# Patient Record
Sex: MALE | Race: WHITE | NOT HISPANIC OR LATINO | URBAN - METROPOLITAN AREA
[De-identification: names, ages, dates, MRNs, and addresses within clinical notes are randomized per-mention and may not be internally consistent; named-entity substitution may affect disease eponyms.]

---

## 2023-10-16 PROBLEM — L71.9 ROSACEA, UNSPECIFIED: Status: ACTIVE | Noted: 2022-08-31

## 2023-10-16 PROBLEM — L73.8 OTHER SPECIFIED FOLLICULAR DISORDERS: Status: ACTIVE | Noted: 2022-08-31

## 2023-10-16 PROBLEM — L70.0 ACNE VULGARIS: Status: ACTIVE | Noted: 2022-08-31

## 2023-10-17 ENCOUNTER — OFFICE VISIT (OUTPATIENT)
Dept: DERMATOLOGY | Facility: CLINIC | Age: 53
End: 2023-10-17
Payer: COMMERCIAL

## 2023-10-17 DIAGNOSIS — L71.9 ROSACEA: Primary | ICD-10-CM

## 2023-10-17 PROCEDURE — 99213 OFFICE O/P EST LOW 20 MIN: CPT | Performed by: DERMATOLOGY

## 2023-10-17 RX ORDER — TAZAROTENE 1 MG/G
CREAM TOPICAL
COMMUNITY
Start: 2023-04-21

## 2023-10-17 RX ORDER — TAZAROTENE 1 MG/G
CREAM TOPICAL
Qty: 30 G | Refills: 11 | Status: SHIPPED | OUTPATIENT
Start: 2023-10-17

## 2023-10-17 RX ORDER — DOXEPIN HYDROCHLORIDE 10 MG/ML
10 SOLUTION ORAL
COMMUNITY
Start: 2023-06-27

## 2023-10-17 RX ORDER — LORATADINE 10 MG/1
10 TABLET ORAL
COMMUNITY
Start: 2022-11-26

## 2023-10-17 RX ORDER — AZELAIC ACID 0.15 G/G
GEL TOPICAL
Qty: 50 G | Refills: 11 | Status: SHIPPED | OUTPATIENT
Start: 2023-10-17

## 2023-10-17 RX ORDER — RAMIPRIL 10 MG/1
10 CAPSULE ORAL DAILY
COMMUNITY

## 2023-10-17 RX ORDER — CLASCOTERONE 1 G/100G
CREAM TOPICAL
COMMUNITY

## 2023-10-17 RX ORDER — CLASCOTERONE 1 G/100G
CREAM TOPICAL
Qty: 60 G | Refills: 11 | Status: SHIPPED | OUTPATIENT
Start: 2023-10-17 | End: 2024-05-24 | Stop reason: SDUPTHER

## 2023-10-17 RX ORDER — AZELAIC ACID 0.15 G/G
GEL TOPICAL
COMMUNITY
Start: 2023-06-20

## 2023-10-17 RX ORDER — AMOXICILLIN 250 MG/1
CAPSULE ORAL
Qty: 180 CAPSULE | Refills: 3 | Status: SHIPPED | OUTPATIENT
Start: 2023-10-17

## 2023-10-17 RX ORDER — AMOXICILLIN 250 MG/1
CAPSULE ORAL
COMMUNITY
Start: 2023-09-25

## 2023-10-17 NOTE — PROGRESS NOTES
Subjective     Ravin Ashraf is a 53 y.o. male who presents for the following: Rosacea (Pt using amoxicillin, azelaic acid gel, tazorac 0.1% cream, winlevi 1% cream. - pt states at baseline. ).     Review of Systems:  No other skin or systemic complaints other than what is documented elsewhere in the note.    The following portions of the chart were reviewed this encounter and updated as appropriate:   Allergies  Meds  Problems  Med Hx  Surg Hx  Fam Hx         Skin Cancer History  No skin cancer on file.      Specialty Problems          Dermatology Problems    Acne vulgaris    Other specified follicular disorders    Rosacea, unspecified        Objective   Well appearing patient in no apparent distress; mood and affect are within normal limits.    A focused skin examination was performed. All findings within normal limits unless otherwise noted below.    Assessment/Plan   1. Rosacea  Head - Anterior (Face)  Mild erythema; telangiectasias    -Discussed nature of this chronic condition  -Recommend gentle skin care habits including gentle cleansers, non-comedogenic physical/mineral based sunscreen daily. Avoid exfoliation, wind and extreme temperatures when possible.  -Stable, recommend to continue regimen  -Continue amoxicillin 150mg BID  -Continue azelaic acid 15% gel once daily  -Continue tazorac 0.1% cream once daily  -Continue Winlevi 1% cream once daily  -Prescriptions refilled today    azelaic acid (Finacea) 15 % gel - Head - Anterior (Face)  Apply to affected areas once daily    Related Medications  amoxicillin (Amoxil) 250 mg capsule  Take one capsule by mouth twice daily    tazarotene (Tazorac) 0.1 % cream  Apply to affected area once daily    clascoterone (Winlevi) 1 % cream  Apply a generous layer to the face once daily        Follow up in 1 year for Rosacea  Discussed if there are any changes or development of concerning symptoms (lesion/skin condition is changing, bleeding, enlarging, or worsening)  the patient is to contact my office. The patient verbalizes understanding.    Caridad Perez MD  10/17/2023

## 2023-10-30 ENCOUNTER — TELEPHONE (OUTPATIENT)
Dept: DERMATOLOGY | Facility: CLINIC | Age: 53
End: 2023-10-30
Payer: COMMERCIAL

## 2023-10-30 NOTE — TELEPHONE ENCOUNTER
Pt was contacted at this time to inform that the [facility] is no longer able to fill the Winlevi.  Pt states he has a number to call that he is able to get the medication for approx $50.00, pt states he will use this service and that if the office has to do anything he will reach out.

## 2024-05-23 DIAGNOSIS — L71.9 ROSACEA: Primary | ICD-10-CM

## 2024-05-24 ENCOUNTER — TELEPHONE (OUTPATIENT)
Dept: DERMATOLOGY | Facility: CLINIC | Age: 54
End: 2024-05-24
Payer: COMMERCIAL

## 2024-05-24 DIAGNOSIS — L71.9 ROSACEA: ICD-10-CM

## 2024-05-24 RX ORDER — CLASCOTERONE 1 G/100G
CREAM TOPICAL
Qty: 60 G | Refills: 5 | Status: SHIPPED | OUTPATIENT
Start: 2024-05-24

## 2024-05-24 NOTE — TELEPHONE ENCOUNTER
T SAW A DR FOR HIS HYPERPLASIA DR GAVE HIM A REFILL BUT WILL NOT BE RETURNING TO THIS DR.  WANTS TO KNOW IF DR PICHARDO CAN REFILL?  IF HE NEEDS TO COME IN AND SEE DR PICHARDO FOR A VISIT HE WILL.

## 2024-05-24 NOTE — PROGRESS NOTES
Patient requesting Winlevi Rx to [facility]. I ordered this for the patient in October. Rx re-ordered and sent to requested pharmacy. Nurse to notify the patient.

## 2024-05-28 RX ORDER — TAZAROTENE 1 MG/G
CREAM TOPICAL
Qty: 30 G | Refills: 0 | Status: SHIPPED | OUTPATIENT
Start: 2024-05-28

## 2024-10-15 ENCOUNTER — APPOINTMENT (OUTPATIENT)
Dept: DERMATOLOGY | Facility: CLINIC | Age: 54
End: 2024-10-15
Payer: COMMERCIAL

## 2024-10-15 DIAGNOSIS — L71.9 ROSACEA: Primary | ICD-10-CM

## 2024-10-15 DIAGNOSIS — L82.1 SEBORRHEIC KERATOSIS: ICD-10-CM

## 2024-10-15 PROCEDURE — 99213 OFFICE O/P EST LOW 20 MIN: CPT | Performed by: DERMATOLOGY

## 2024-10-15 RX ORDER — AMOXICILLIN 250 MG/1
CAPSULE ORAL
Qty: 180 CAPSULE | Refills: 3 | Status: SHIPPED | OUTPATIENT
Start: 2024-10-15

## 2024-10-15 NOTE — PROGRESS NOTES
Subjective     Ravin Ashraf is a 54 y.o. male who presents for the following: Rosacea (Pt currently taking amoxicillin, using azelaic acid gel, tazorac cream and winlevi cream- good response. ).     Review of Systems:  No other skin or systemic complaints other than what is documented elsewhere in the note.    The following portions of the chart were reviewed this encounter and updated as appropriate:   Allergies  Meds  Problems  Med Hx  Surg Hx  Fam Hx                 Objective   Well appearing patient in no apparent distress; mood and affect are within normal limits.    A focused skin examination was performed. All findings within normal limits unless otherwise noted below.    Assessment/Plan   1. Rosacea  Head - Anterior (Face)  Telangiectasias today; no erythema    Well controlled  -Stable, recommend to continue regimen  -Patient may seek PDL with a dermatologist at [place] or [place] who offers this if wishes to improve telangiectasias  -Continue amoxicillin 150mg BID; refilled today  -Continue azelaic acid 15% gel once daily; patient declines refills today, will call when desires refills  -Continue tazorac 0.1% cream once daily; patient declines refills today, will call when desires refills  -Continue Winlevi 1% cream once daily; patient declines refills today, will call when desires refills. Sent to [facility] in [place]      Related Medications  clascoterone (Winlevi) 1 % cream  Apply a generous layer to the face once daily    amoxicillin (Amoxil) 250 mg capsule  Take one capsule by mouth two times a day with food    2. Seborrheic keratosis  Left Postauricular Area  Stuck on, waxy papule with comedo-like openings and milia like cysts    (lesion of patient's concern)  -Discussed the nature of the diagnosis  -Reassurance, recommend continued observation        Follow up in 1 year for Rosacea  Discussed if there are any changes or development of concerning symptoms (lesion/skin condition is  changing, bleeding, enlarging, or worsening) the patient is to contact my office. The patient verbalizes understanding.    Caridad Perez MD  10/15/2024

## 2025-01-07 DIAGNOSIS — L71.9 ROSACEA: Primary | ICD-10-CM

## 2025-01-07 RX ORDER — AZELAIC ACID 0.15 G/G
GEL TOPICAL
Qty: 50 G | Refills: 0 | Status: SHIPPED | OUTPATIENT
Start: 2025-01-07

## 2025-04-27 DIAGNOSIS — L71.9 ROSACEA: ICD-10-CM

## 2025-04-28 RX ORDER — AZELAIC ACID 0.15 G/G
GEL TOPICAL
Qty: 50 G | Refills: 1 | Status: SHIPPED | OUTPATIENT
Start: 2025-04-28

## 2025-05-01 DIAGNOSIS — L71.9 ROSACEA: Primary | ICD-10-CM

## 2025-05-01 RX ORDER — TAZAROTENE 1 MG/G
CREAM TOPICAL
Qty: 30 G | Refills: 1 | Status: SHIPPED | OUTPATIENT
Start: 2025-05-01

## 2025-06-03 DIAGNOSIS — L71.9 ROSACEA: ICD-10-CM

## 2025-06-04 RX ORDER — CLASCOTERONE 1 G/100G
CREAM TOPICAL
Qty: 60 G | Refills: 5 | Status: SHIPPED | OUTPATIENT
Start: 2025-06-04

## 2025-10-21 ENCOUNTER — APPOINTMENT (OUTPATIENT)
Dept: DERMATOLOGY | Facility: CLINIC | Age: 55
End: 2025-10-21
Payer: COMMERCIAL